# Patient Record
Sex: MALE | Race: WHITE | NOT HISPANIC OR LATINO | Employment: OTHER | URBAN - METROPOLITAN AREA
[De-identification: names, ages, dates, MRNs, and addresses within clinical notes are randomized per-mention and may not be internally consistent; named-entity substitution may affect disease eponyms.]

---

## 2018-05-27 NOTE — PROGRESS NOTES
Subjective:   Patient ID:  Gerardo Glez is a 68 y.o. male who presents for follow-up of fatigue    HPI:  The patient has no chest pain, TIA, palpitations, syncope or pre-syncope.Fatigue has been for quite a while but progressive. Used to walk 4 miles daily but now only a few per week and slower and some SOB/BERMAN. No chest pain. Most BPs high-and lows are in the mid to upper 130s.      Review of Systems   Constitution: Positive for malaise/fatigue. Negative for chills, decreased appetite, diaphoresis, fever, weakness, night sweats, weight gain and weight loss.   HENT: Negative for congestion, hoarse voice, nosebleeds, sore throat and tinnitus.    Eyes: Negative for blurred vision, double vision, vision loss in left eye, vision loss in right eye, visual disturbance and visual halos.   Cardiovascular: Positive for dyspnea on exertion. Negative for chest pain, claudication, cyanosis, irregular heartbeat, leg swelling, near-syncope, orthopnea, palpitations, paroxysmal nocturnal dyspnea and syncope.   Respiratory: Positive for shortness of breath. Negative for cough, hemoptysis, sleep disturbances due to breathing, snoring, sputum production and wheezing.    Endocrine: Negative for cold intolerance, heat intolerance, polydipsia, polyphagia and polyuria.   Hematologic/Lymphatic: Negative for adenopathy and bleeding problem. Does not bruise/bleed easily.   Skin: Negative for color change, dry skin, flushing, itching, nail changes, poor wound healing, rash, skin cancer, suspicious lesions and unusual hair distribution.   Musculoskeletal: Negative for arthritis, back pain, falls, gout, joint pain, joint swelling, muscle cramps, muscle weakness, myalgias and stiffness.   Gastrointestinal: Negative for abdominal pain, anorexia, change in bowel habit, constipation, diarrhea, dysphagia, heartburn, hematemesis, hematochezia, melena and vomiting.   Genitourinary: Negative for decreased libido, dysuria, hematuria, hesitancy and urgency.  "  Neurological: Negative for excessive daytime sleepiness, dizziness, focal weakness, headaches, light-headedness, loss of balance, numbness, paresthesias, seizures, sensory change, tremors and vertigo.   Psychiatric/Behavioral: Negative for altered mental status, depression, hallucinations, memory loss, substance abuse and suicidal ideas. The patient does not have insomnia and is not nervous/anxious.    Allergic/Immunologic: Negative for environmental allergies and hives.       Objective: BP (!) 162/88 (BP Location: Right arm, Patient Position: Sitting, BP Method: Large (Manual))   Pulse 82   Ht 5' 10" (1.778 m)   Wt 114 kg (251 lb 7 oz)   BMI 36.08 kg/m²      Physical Exam   Constitutional: He is oriented to person, place, and time. He appears well-developed and well-nourished. No distress.   HENT:   Head: Normocephalic.   Eyes: EOM are normal. Pupils are equal, round, and reactive to light.   Neck: Normal range of motion. No thyromegaly present.   Cardiovascular: Normal rate, regular rhythm, normal heart sounds and intact distal pulses.  Exam reveals no gallop and no friction rub.    No murmur heard.  Pulses:       Carotid pulses are 3+ on the right side, and 3+ on the left side.       Radial pulses are 3+ on the right side, and 3+ on the left side.        Femoral pulses are 3+ on the right side, and 3+ on the left side.       Popliteal pulses are 3+ on the right side, and 3+ on the left side.        Dorsalis pedis pulses are 3+ on the right side, and 3+ on the left side.        Posterior tibial pulses are 3+ on the right side, and 3+ on the left side.   Pulmonary/Chest: Effort normal and breath sounds normal. No respiratory distress. He has no wheezes. He has no rales. He exhibits no tenderness.   Abdominal: Soft. He exhibits no distension and no mass. There is no tenderness.   Musculoskeletal: Normal range of motion.   Lymphadenopathy:     He has no cervical adenopathy.   Neurological: He is alert and " oriented to person, place, and time.   Skin: Skin is warm. He is not diaphoretic. No cyanosis. Nails show no clubbing.   Psychiatric: He has a normal mood and affect. His speech is normal and behavior is normal. Judgment and thought content normal. Cognition and memory are normal.       Assessment:     1. Impaired fasting glucose    2. Chronic fatigue    3. HTN (hypertension), benign    4. Dyslipidemia    5. Abnormal ECG    6. Benign prostatic hyperplasia without lower urinary tract symptoms    7. Class 2 severe obesity due to excess calories with serious comorbidity and body mass index (BMI) of 36.0 to 36.9 in adult    8. At risk for coronary artery disease    9. HTN (hypertension)        Plan:   Discussed diet , achieving and maintaining ideal body weight, and exercise.   We reviewed meds in detail.  Take the HCTZ half daily  Add amlodipine 5 mg at nite  Wt loss and increased exercise  Baby ASA daily  Omega-3 > 800 per day of combined EPA/DHA    Gerardo was seen today for fatigue.    Diagnoses and all orders for this visit:    Impaired fasting glucose  -     Hemoglobin A1c; Future; Expected date: 05/29/2018  -     CT Cardiac Scoring; Future; Expected date: 05/29/2018    Chronic fatigue  -     TESTOSTERONE PANEL; Future; Expected date: 05/29/2018  -     Sedimentation rate, manual; Future; Expected date: 05/29/2018  -     TSH; Future; Expected date: 05/29/2018  -     Exercise stress echo with color flow; Future; Expected date: 05/29/2018    HTN (hypertension), benign  -     IN OFFICE EKG 12-LEAD (to Muse); Future; Expected date: 05/29/2018  -     Lipid panel; Future; Expected date: 05/29/2018  -     Comprehensive metabolic panel; Future; Expected date: 05/29/2018  -     TSH; Future; Expected date: 05/29/2018  -     Hemoglobin A1c; Future; Expected date: 05/29/2018  -     Exercise stress echo with color flow; Future; Expected date: 05/29/2018  -     CT Cardiac Scoring; Future; Expected date: 05/29/2018  -      amLODIPine (NORVASC) 5 MG tablet; Take 1 tablet (5 mg total) by mouth once daily. One-2 per day or as directed  -     aspirin (ECOTRIN) 81 MG EC tablet; Take 1 tablet (81 mg total) by mouth once daily.    Dyslipidemia  -     Lipid panel; Future; Expected date: 05/29/2018  -     Comprehensive metabolic panel; Future; Expected date: 05/29/2018  -     TSH; Future; Expected date: 05/29/2018  -     Hemoglobin A1c; Future; Expected date: 05/29/2018  -     Exercise stress echo with color flow; Future; Expected date: 05/29/2018  -     CT Cardiac Scoring; Future; Expected date: 05/29/2018  -     amLODIPine (NORVASC) 5 MG tablet; Take 1 tablet (5 mg total) by mouth once daily. One-2 per day or as directed  -     aspirin (ECOTRIN) 81 MG EC tablet; Take 1 tablet (81 mg total) by mouth once daily.    Abnormal ECG  -     IN OFFICE EKG 12-LEAD (to Muse); Future; Expected date: 05/29/2018  -     Lipid panel; Future; Expected date: 05/29/2018  -     Comprehensive metabolic panel; Future; Expected date: 05/29/2018  -     Exercise stress echo with color flow; Future; Expected date: 05/29/2018  -     CT Cardiac Scoring; Future; Expected date: 05/29/2018  -     amLODIPine (NORVASC) 5 MG tablet; Take 1 tablet (5 mg total) by mouth once daily. One-2 per day or as directed  -     aspirin (ECOTRIN) 81 MG EC tablet; Take 1 tablet (81 mg total) by mouth once daily.    Benign prostatic hyperplasia without lower urinary tract symptoms    Class 2 severe obesity due to excess calories with serious comorbidity and body mass index (BMI) of 36.0 to 36.9 in adult  -     Comprehensive metabolic panel; Future; Expected date: 05/29/2018  -     TSH; Future; Expected date: 05/29/2018  -     Hemoglobin A1c; Future; Expected date: 05/29/2018    At risk for coronary artery disease  -     CT Cardiac Scoring; Future; Expected date: 05/29/2018  -     amLODIPine (NORVASC) 5 MG tablet; Take 1 tablet (5 mg total) by mouth once daily. One-2 per day or as  directed  -     aspirin (ECOTRIN) 81 MG EC tablet; Take 1 tablet (81 mg total) by mouth once daily.    HTN (hypertension)  -     IN OFFICE EKG 12-LEAD (to Muse); Future; Expected date: 05/29/2018    Other orders  -     losartan (COZAAR) 100 MG tablet; Take 100 mg by mouth once daily.  -     tamsulosin HCl (TAMSULOSIN ORAL); Take 0.4 mg by mouth once daily.  -     rosuvastatin (CRESTOR) 10 MG tablet; Take 10 mg by mouth once daily.  -     hydroCHLOROthiazide (HYDRODIURIL) 25 MG tablet; Take 25 mg by mouth twice a week.  -     naproxen sodium (ANAPROX) 220 MG tablet; Take 220 mg by mouth once daily.  -     diphenhydrAMINE (BENADRYL) 25 mg capsule; Take 25 mg by mouth nightly as needed for Insomnia.  -     melatonin 5 mg Tab; Take 5 mg by mouth once daily.  -     sertraline (ZOLOFT) 100 MG tablet; Take 100 mg by mouth once daily.            Follow-up if symptoms worsen or fail to improve.

## 2018-05-28 ENCOUNTER — OFFICE VISIT (OUTPATIENT)
Dept: CARDIOLOGY | Facility: CLINIC | Age: 68
End: 2018-05-28
Payer: MEDICARE

## 2018-05-28 VITALS
WEIGHT: 251.44 LBS | SYSTOLIC BLOOD PRESSURE: 162 MMHG | HEIGHT: 70 IN | BODY MASS INDEX: 36 KG/M2 | HEART RATE: 82 BPM | DIASTOLIC BLOOD PRESSURE: 88 MMHG

## 2018-05-28 DIAGNOSIS — I10 HTN (HYPERTENSION), BENIGN: ICD-10-CM

## 2018-05-28 DIAGNOSIS — Z91.89 AT RISK FOR CORONARY ARTERY DISEASE: ICD-10-CM

## 2018-05-28 DIAGNOSIS — R94.31 ABNORMAL ECG: ICD-10-CM

## 2018-05-28 DIAGNOSIS — R73.01 IMPAIRED FASTING GLUCOSE: Primary | ICD-10-CM

## 2018-05-28 DIAGNOSIS — E66.01 CLASS 2 SEVERE OBESITY DUE TO EXCESS CALORIES WITH SERIOUS COMORBIDITY AND BODY MASS INDEX (BMI) OF 36.0 TO 36.9 IN ADULT: ICD-10-CM

## 2018-05-28 DIAGNOSIS — R53.82 CHRONIC FATIGUE: ICD-10-CM

## 2018-05-28 DIAGNOSIS — E78.5 DYSLIPIDEMIA: ICD-10-CM

## 2018-05-28 DIAGNOSIS — N40.0 BENIGN PROSTATIC HYPERPLASIA WITHOUT LOWER URINARY TRACT SYMPTOMS: ICD-10-CM

## 2018-05-28 DIAGNOSIS — I10 HTN (HYPERTENSION): ICD-10-CM

## 2018-05-28 PROBLEM — E66.812 CLASS 2 SEVERE OBESITY DUE TO EXCESS CALORIES WITH SERIOUS COMORBIDITY AND BODY MASS INDEX (BMI) OF 36.0 TO 36.9 IN ADULT: Status: ACTIVE | Noted: 2018-05-28

## 2018-05-28 PROCEDURE — 99204 OFFICE O/P NEW MOD 45 MIN: CPT | Mod: S$PBB,,, | Performed by: INTERNAL MEDICINE

## 2018-05-28 PROCEDURE — 93010 ELECTROCARDIOGRAM REPORT: CPT | Mod: ,,, | Performed by: INTERNAL MEDICINE

## 2018-05-28 PROCEDURE — 99999 PR PBB SHADOW E&M-NEW PATIENT-LVL III: CPT | Mod: PBBFAC,,, | Performed by: INTERNAL MEDICINE

## 2018-05-28 PROCEDURE — 99203 OFFICE O/P NEW LOW 30 MIN: CPT | Mod: PBBFAC,PO,25 | Performed by: INTERNAL MEDICINE

## 2018-05-28 PROCEDURE — 93005 ELECTROCARDIOGRAM TRACING: CPT | Mod: PBBFAC,PO | Performed by: INTERNAL MEDICINE

## 2018-05-28 RX ORDER — ASPIRIN 81 MG/1
81 TABLET ORAL DAILY
Qty: 30 TABLET | Refills: 0
Start: 2018-05-28 | End: 2020-11-09

## 2018-05-28 RX ORDER — AMLODIPINE BESYLATE 5 MG/1
5 TABLET ORAL DAILY
Qty: 90 TABLET | Refills: 3 | Status: SHIPPED | OUTPATIENT
Start: 2018-05-28 | End: 2018-05-29 | Stop reason: SDUPTHER

## 2018-05-28 RX ORDER — HYDROCHLOROTHIAZIDE 25 MG/1
25 TABLET ORAL
COMMUNITY
End: 2022-03-10 | Stop reason: SDUPTHER

## 2018-05-28 RX ORDER — DIPHENHYDRAMINE HCL 25 MG
25 CAPSULE ORAL NIGHTLY PRN
COMMUNITY

## 2018-05-28 RX ORDER — SERTRALINE HYDROCHLORIDE 100 MG/1
100 TABLET, FILM COATED ORAL DAILY
COMMUNITY

## 2018-05-28 RX ORDER — ROSUVASTATIN CALCIUM 10 MG/1
10 TABLET, COATED ORAL DAILY
COMMUNITY

## 2018-05-28 RX ORDER — ACETAMINOPHEN 500 MG
10 TABLET ORAL DAILY
COMMUNITY

## 2018-05-28 RX ORDER — NAPROXEN SODIUM 220 MG
220 TABLET ORAL DAILY
COMMUNITY
End: 2020-11-09

## 2018-05-28 RX ORDER — LOSARTAN POTASSIUM 100 MG/1
100 TABLET ORAL DAILY
COMMUNITY

## 2018-05-28 NOTE — PATIENT INSTRUCTIONS
Discussed diet , achieving and maintaining ideal body weight, and exercise.   We reviewed meds in detail.  Take the HCTZ half daily  Add amlodipine 5 mg at nite  Wt loss and increased exercise  Baby ASA daily  Omega-3 > 800 per day of combined EPA/DHA

## 2018-05-29 DIAGNOSIS — Z91.89 AT RISK FOR CORONARY ARTERY DISEASE: ICD-10-CM

## 2018-05-29 DIAGNOSIS — I10 HTN (HYPERTENSION), BENIGN: ICD-10-CM

## 2018-05-29 DIAGNOSIS — E78.5 DYSLIPIDEMIA: ICD-10-CM

## 2018-05-29 DIAGNOSIS — R94.31 ABNORMAL ECG: ICD-10-CM

## 2018-05-30 ENCOUNTER — TELEPHONE (OUTPATIENT)
Dept: CARDIOLOGY | Facility: CLINIC | Age: 68
End: 2018-05-30

## 2018-05-30 RX ORDER — AMLODIPINE BESYLATE 5 MG/1
TABLET ORAL
Qty: 90 TABLET | Refills: 3 | Status: SHIPPED | OUTPATIENT
Start: 2018-05-30 | End: 2019-11-15 | Stop reason: SDUPTHER

## 2018-05-30 NOTE — TELEPHONE ENCOUNTER
----- Message from Avery Ricks sent at 5/30/2018 11:06 AM CDT -----  Contact: patient  Please call pt at [phone] if any questions. Need clarification for Amlodipine 5 mg called into [facility] at [phone]    Last seen Dr Pepper 05/28/18    Thank you

## 2018-05-31 ENCOUNTER — LAB VISIT (OUTPATIENT)
Dept: LAB | Facility: HOSPITAL | Age: 68
End: 2018-05-31
Attending: INTERNAL MEDICINE
Payer: MEDICARE

## 2018-05-31 DIAGNOSIS — I10 HTN (HYPERTENSION), BENIGN: ICD-10-CM

## 2018-05-31 DIAGNOSIS — E78.5 DYSLIPIDEMIA: ICD-10-CM

## 2018-05-31 DIAGNOSIS — R53.82 CHRONIC FATIGUE: ICD-10-CM

## 2018-05-31 DIAGNOSIS — R73.01 IMPAIRED FASTING GLUCOSE: ICD-10-CM

## 2018-05-31 DIAGNOSIS — E66.01 CLASS 2 SEVERE OBESITY DUE TO EXCESS CALORIES WITH SERIOUS COMORBIDITY AND BODY MASS INDEX (BMI) OF 36.0 TO 36.9 IN ADULT: ICD-10-CM

## 2018-05-31 DIAGNOSIS — R94.31 ABNORMAL ECG: ICD-10-CM

## 2018-05-31 LAB
ALBUMIN SERPL BCP-MCNC: 3.9 G/DL
ALP SERPL-CCNC: 98 U/L
ALT SERPL W/O P-5'-P-CCNC: 25 U/L
ANION GAP SERPL CALC-SCNC: 7 MMOL/L
AST SERPL-CCNC: 21 U/L
BILIRUB SERPL-MCNC: 0.6 MG/DL
BUN SERPL-MCNC: 17 MG/DL
CALCIUM SERPL-MCNC: 9.1 MG/DL
CHLORIDE SERPL-SCNC: 107 MMOL/L
CHOLEST SERPL-MCNC: 123 MG/DL
CHOLEST/HDLC SERPL: 2.7 {RATIO}
CO2 SERPL-SCNC: 25 MMOL/L
CREAT SERPL-MCNC: 1 MG/DL
ERYTHROCYTE [SEDIMENTATION RATE] IN BLOOD BY WESTERGREN METHOD: 7 MM/HR
EST. GFR  (AFRICAN AMERICAN): >60 ML/MIN/1.73 M^2
EST. GFR  (NON AFRICAN AMERICAN): >60 ML/MIN/1.73 M^2
ESTIMATED AVG GLUCOSE: 108 MG/DL
GLUCOSE SERPL-MCNC: 111 MG/DL
HBA1C MFR BLD HPLC: 5.4 %
HDLC SERPL-MCNC: 46 MG/DL
HDLC SERPL: 37.4 %
LDLC SERPL CALC-MCNC: 66.4 MG/DL
NONHDLC SERPL-MCNC: 77 MG/DL
POTASSIUM SERPL-SCNC: 4 MMOL/L
PROT SERPL-MCNC: 7 G/DL
SODIUM SERPL-SCNC: 139 MMOL/L
TRIGL SERPL-MCNC: 53 MG/DL
TSH SERPL DL<=0.005 MIU/L-ACNC: 2.6 UIU/ML

## 2018-05-31 PROCEDURE — 84443 ASSAY THYROID STIM HORMONE: CPT

## 2018-05-31 PROCEDURE — 85651 RBC SED RATE NONAUTOMATED: CPT

## 2018-05-31 PROCEDURE — 80061 LIPID PANEL: CPT

## 2018-05-31 PROCEDURE — 36415 COLL VENOUS BLD VENIPUNCTURE: CPT | Mod: PO

## 2018-05-31 PROCEDURE — 83036 HEMOGLOBIN GLYCOSYLATED A1C: CPT

## 2018-05-31 PROCEDURE — 80053 COMPREHEN METABOLIC PANEL: CPT

## 2018-05-31 PROCEDURE — 82040 ASSAY OF SERUM ALBUMIN: CPT

## 2018-06-05 ENCOUNTER — HOSPITAL ENCOUNTER (OUTPATIENT)
Dept: CARDIOLOGY | Facility: CLINIC | Age: 68
Discharge: HOME | End: 2018-06-05
Attending: INTERNAL MEDICINE
Payer: MEDICARE

## 2018-06-05 ENCOUNTER — DOCUMENTATION ONLY (OUTPATIENT)
Dept: CARDIOLOGY | Facility: CLINIC | Age: 68
End: 2018-06-05

## 2018-06-05 ENCOUNTER — TELEPHONE (OUTPATIENT)
Dept: CARDIOLOGY | Facility: CLINIC | Age: 68
End: 2018-06-05

## 2018-06-05 ENCOUNTER — HOSPITAL ENCOUNTER (OUTPATIENT)
Dept: RADIOLOGY | Facility: HOSPITAL | Age: 68
Discharge: HOME | End: 2018-06-05
Attending: INTERNAL MEDICINE
Payer: MEDICARE

## 2018-06-05 DIAGNOSIS — E78.5 DYSLIPIDEMIA: ICD-10-CM

## 2018-06-05 DIAGNOSIS — Z91.89 AT RISK FOR CORONARY ARTERY DISEASE: ICD-10-CM

## 2018-06-05 DIAGNOSIS — R94.31 ABNORMAL ECG: ICD-10-CM

## 2018-06-05 DIAGNOSIS — I10 HTN (HYPERTENSION), BENIGN: ICD-10-CM

## 2018-06-05 DIAGNOSIS — R73.01 IMPAIRED FASTING GLUCOSE: ICD-10-CM

## 2018-06-05 DIAGNOSIS — R53.82 CHRONIC FATIGUE: ICD-10-CM

## 2018-06-05 LAB
DIASTOLIC DYSFUNCTION: NO
ESTIMATED PA SYSTOLIC PRESSURE: 21.32
RETIRED EF AND QEF - SEE NOTES: 60 (ref 55–65)
TRICUSPID VALVE REGURGITATION: NORMAL

## 2018-06-05 PROCEDURE — 93325 DOPPLER ECHO COLOR FLOW MAPG: CPT | Mod: PBBFAC | Performed by: INTERNAL MEDICINE

## 2018-06-05 PROCEDURE — 93320 DOPPLER ECHO COMPLETE: CPT | Mod: 26,S$PBB,, | Performed by: INTERNAL MEDICINE

## 2018-06-05 PROCEDURE — 75571 CT HRT W/O DYE W/CA TEST: CPT | Mod: TC

## 2018-06-05 PROCEDURE — 93351 STRESS TTE COMPLETE: CPT | Mod: 26,S$PBB,, | Performed by: INTERNAL MEDICINE

## 2018-06-05 PROCEDURE — 75571 CT HRT W/O DYE W/CA TEST: CPT | Mod: 26,,, | Performed by: RADIOLOGY

## 2018-06-06 ENCOUNTER — TELEPHONE (OUTPATIENT)
Dept: CARDIOLOGY | Facility: CLINIC | Age: 68
End: 2018-06-06

## 2018-06-06 LAB
ALBUMIN SERPL-MCNC: 4.4 G/DL (ref 3.6–5.1)
SHBG SERPL-SCNC: 50 NMOL/L (ref 22–77)
TESTOST FREE SERPL-MCNC: 53.3 PG/ML (ref 46–224)
TESTOST SERPL-MCNC: 558 NG/DL (ref 250–1100)
TESTOSTERONE.FREE+WB SERPL-MCNC: 107.4 NG/DL (ref 110–575)

## 2018-06-06 NOTE — TELEPHONE ENCOUNTER
Pt instructed as ordered by  that his stress test came out good and his b/p was high with exercise but not bad at rest. If his systolic b/p is >130 consistently then he will have to adjust his b/p medications.The pt said he had not been checking it regularly and was not sure, he will take his b/p daily for 1 week and he will call back in one week with his b/p readings.Pt reports understanding of these instructions.

## 2018-06-06 NOTE — TELEPHONE ENCOUNTER
----- Message from Bryan Pepper MD sent at 6/6/2018  1:02 PM CDT -----  Testosterone level seems OK

## 2018-10-09 ENCOUNTER — TELEPHONE (OUTPATIENT)
Dept: CARDIOLOGY | Facility: CLINIC | Age: 68
End: 2018-10-09

## 2018-10-09 NOTE — TELEPHONE ENCOUNTER
----- Message from Snow Desir sent at 10/9/2018  9:42 AM CDT -----  Contact: Pt called   Pt would like to know what Pulmonologist does Dr. Pepper work with. Please call pt @ [phone]. Thank you.

## 2019-11-15 DIAGNOSIS — I10 HTN (HYPERTENSION), BENIGN: ICD-10-CM

## 2019-11-15 DIAGNOSIS — E78.5 DYSLIPIDEMIA: ICD-10-CM

## 2019-11-15 DIAGNOSIS — R94.31 ABNORMAL ECG: ICD-10-CM

## 2019-11-15 DIAGNOSIS — Z91.89 AT RISK FOR CORONARY ARTERY DISEASE: ICD-10-CM

## 2019-11-15 RX ORDER — AMLODIPINE BESYLATE 5 MG/1
TABLET ORAL
Qty: 90 TABLET | Refills: 3 | Status: SHIPPED | OUTPATIENT
Start: 2019-11-15 | End: 2020-11-09 | Stop reason: SDUPTHER

## 2019-11-15 NOTE — TELEPHONE ENCOUNTER
----- Message from Ashley Álvarez sent at 11/15/2019  2:19 PM CST -----  Contact: pt  Patient is calling for an RX refill or new RX.  Is this a refill or new RX:  refill  RX name and strength: amLODIPine (NORVASC) 5 MG tablet  Directions Take One-2 tablets by mouth per day or as directed  Is this a 30 day or 90 day RX: 90   Local pharmacy or mail order pharmacy:  local  Pharm[facility] name and phone #    [facility] [address] [facility] [phone] (Phone)  [phone] (Fax)    Dr. Shantelle STREETER 5/28/18    Thank you

## 2020-11-09 ENCOUNTER — OFFICE VISIT (OUTPATIENT)
Dept: CARDIOLOGY | Facility: CLINIC | Age: 70
End: 2020-11-09
Payer: MEDICARE

## 2020-11-09 ENCOUNTER — HOSPITAL ENCOUNTER (OUTPATIENT)
Dept: CARDIOLOGY | Facility: CLINIC | Age: 70
Discharge: HOME | End: 2020-11-09
Payer: MEDICARE

## 2020-11-09 VITALS
SYSTOLIC BLOOD PRESSURE: 137 MMHG | BODY MASS INDEX: 39.29 KG/M2 | DIASTOLIC BLOOD PRESSURE: 82 MMHG | WEIGHT: 280.63 LBS | OXYGEN SATURATION: 96 % | HEIGHT: 71 IN | HEART RATE: 84 BPM

## 2020-11-09 DIAGNOSIS — R93.1 AGATSTON CORONARY ARTERY CALCIUM SCORE BETWEEN 100 AND 199: ICD-10-CM

## 2020-11-09 DIAGNOSIS — I25.10 CORONARY ARTERY CALCIFICATION SEEN ON CAT SCAN: ICD-10-CM

## 2020-11-09 DIAGNOSIS — R94.31 ABNORMAL ECG: ICD-10-CM

## 2020-11-09 DIAGNOSIS — E66.01 CLASS 2 SEVERE OBESITY DUE TO EXCESS CALORIES WITH SERIOUS COMORBIDITY AND BODY MASS INDEX (BMI) OF 39.0 TO 39.9 IN ADULT: ICD-10-CM

## 2020-11-09 DIAGNOSIS — I10 HTN (HYPERTENSION), BENIGN: ICD-10-CM

## 2020-11-09 DIAGNOSIS — E78.5 DYSLIPIDEMIA: ICD-10-CM

## 2020-11-09 DIAGNOSIS — Z91.89 AT RISK FOR CORONARY ARTERY DISEASE: ICD-10-CM

## 2020-11-09 DIAGNOSIS — I25.10 CORONARY ARTERY CALCIFICATION SEEN ON CAT SCAN: Primary | ICD-10-CM

## 2020-11-09 PROCEDURE — 99214 OFFICE O/P EST MOD 30 MIN: CPT | Mod: S$PBB,,, | Performed by: NURSE PRACTITIONER

## 2020-11-09 PROCEDURE — 99214 PR OFFICE/OUTPT VISIT, EST, LEVL IV, 30-39 MIN: ICD-10-PCS | Mod: S$PBB,,, | Performed by: NURSE PRACTITIONER

## 2020-11-09 PROCEDURE — 99214 OFFICE O/P EST MOD 30 MIN: CPT | Mod: PBBFAC,25 | Performed by: NURSE PRACTITIONER

## 2020-11-09 PROCEDURE — 93005 ELECTROCARDIOGRAM TRACING: CPT | Mod: PBBFAC | Performed by: INTERNAL MEDICINE

## 2020-11-09 PROCEDURE — 99999 PR PBB SHADOW E&M-EST. PATIENT-LVL IV: ICD-10-PCS | Mod: PBBFAC,,, | Performed by: NURSE PRACTITIONER

## 2020-11-09 PROCEDURE — 99999 PR PBB SHADOW E&M-EST. PATIENT-LVL IV: CPT | Mod: PBBFAC,,, | Performed by: NURSE PRACTITIONER

## 2020-11-09 PROCEDURE — 93010 ELECTROCARDIOGRAM REPORT: CPT | Mod: S$PBB,,, | Performed by: INTERNAL MEDICINE

## 2020-11-09 PROCEDURE — 93010 EKG 12-LEAD: ICD-10-PCS | Mod: S$PBB,,, | Performed by: INTERNAL MEDICINE

## 2020-11-09 RX ORDER — AMLODIPINE BESYLATE 10 MG/1
TABLET ORAL
Qty: 90 TABLET | Refills: 3 | Status: SHIPPED | OUTPATIENT
Start: 2020-11-09 | End: 2022-02-09

## 2020-11-09 RX ORDER — AMLODIPINE BESYLATE 5 MG/1
TABLET ORAL
Qty: 90 TABLET | Refills: 3 | Status: SHIPPED | OUTPATIENT
Start: 2020-11-09 | End: 2020-11-09

## 2020-11-09 NOTE — PROGRESS NOTES
Mr. Glez is a patient of Dr. Pepper and was last seen in [facility] Visit 5/28/18.      Subjective:   Patient ID:  Gerardo Glez Jr. is a 70 y.o. male who presents for follow-up of Hypertension and Dyslipidemia    Problems:  CACS 143 in 2018  HLD  HTN  HAILE, using cpap    HPI  Mr. Glez is in clinic today for routine follow up.  Patient denies chest pain with exertion or at rest, palpitations, SOB, BERMAN, dizziness, syncope, edema, orthopnea, PND, or claudication.  Reports walking 2 miles in the park about once a week.  Patient is taking rosuvastatin 10 mg and LDL is 66.  His home BP runs in the 130s-150s/70-80s.      Review of Systems   Constitution: Negative for decreased appetite, diaphoresis, malaise/fatigue, weight gain and weight loss.   Eyes: Negative for visual disturbance.   Cardiovascular: Negative for chest pain, claudication, dyspnea on exertion, irregular heartbeat, leg swelling, near-syncope, orthopnea, palpitations, paroxysmal nocturnal dyspnea and syncope.        Denies chest pressure   Respiratory: Negative for cough, hemoptysis, shortness of breath, sleep disturbances due to breathing and snoring.    Endocrine: Negative for cold intolerance and heat intolerance.   Hematologic/Lymphatic: Negative for bleeding problem. Does not bruise/bleed easily.   Musculoskeletal: Negative for myalgias.   Gastrointestinal: Negative for bloating, abdominal pain, anorexia, change in bowel habit, constipation, diarrhea, nausea and vomiting.   Neurological: Negative for difficulty with concentration, disturbances in coordination, excessive daytime sleepiness, dizziness, headaches, light-headedness, loss of balance, numbness and weakness.   Psychiatric/Behavioral: The patient does not have insomnia.        Allergies and current medications updated and reviewed:  Review of patient's allergies indicates:  No Known Allergies  Current Outpatient Medications   Medication Sig    amLODIPine (NORVASC) 5 MG tablet Take One by  "mouth per day or as directed.    diphenhydrAMINE (BENADRYL) 25 mg capsule Take 25 mg by mouth nightly as needed for Insomnia.    hydroCHLOROthiazide (HYDRODIURIL) 25 MG tablet Take 25 mg by mouth twice a week.    losartan (COZAAR) 100 MG tablet Take 100 mg by mouth once daily.    melatonin 5 mg Tab Take 10 mg by mouth once daily.     rosuvastatin (CRESTOR) 10 MG tablet Take 10 mg by mouth once daily.    sertraline (ZOLOFT) 100 MG tablet Take 100 mg by mouth once daily.    tamsulosin HCl (TAMSULOSIN ORAL) Take 0.4 mg by mouth once daily.     No current facility-administered medications for this visit.        Objective:     Right Arm BP - Sittin/83 (20 1503)  Left Arm BP - Sittin/82 (20 1503)    /82 (BP Location: Left arm, Patient Position: Sitting, BP Method: Large (Automatic))   Pulse 84   Ht 5' 11" (1.803 m)   Wt 127.3 kg (280 lb 10.3 oz)   SpO2 96%   BMI 39.14 kg/m²       Physical Exam   Constitutional: He is oriented to person, place, and time. Vital signs are normal. He appears well-developed and well-nourished. He is active. No distress.   HENT:   Head: Normocephalic and atraumatic.   Eyes: Conjunctivae and lids are normal. No scleral icterus.   Neck: Neck supple. Normal carotid pulses, no hepatojugular reflux and no JVD present. Carotid bruit is not present.   Cardiovascular: Normal rate, regular rhythm, S1 normal, S2 normal and intact distal pulses. PMI is not displaced. Exam reveals no gallop and no friction rub.   No murmur heard.  Pulses:       Carotid pulses are 2+ on the right side and 2+ on the left side.       Radial pulses are 2+ on the right side and 2+ on the left side.        Dorsalis pedis pulses are 2+ on the right side and 2+ on the left side.        Posterior tibial pulses are 1+ on the right side and 1+ on the left side.   Pulmonary/Chest: Effort normal and breath sounds normal. No respiratory distress. He has no decreased breath sounds. He has no " wheezes. He has no rhonchi. He has no rales. He exhibits no tenderness.   Abdominal: Soft. Normal appearance and bowel sounds are normal. He exhibits no distension, no fluid wave, no abdominal bruit, no ascites and no pulsatile midline mass. There is no hepatosplenomegaly. There is no abdominal tenderness.   Musculoskeletal:         General: No edema.   Neurological: He is alert and oriented to person, place, and time. Gait normal.   Skin: Skin is warm, dry and intact. No rash noted. He is not diaphoretic. Nails show no clubbing.   Psychiatric: He has a normal mood and affect. His speech is normal and behavior is normal. Judgment and thought content normal. Cognition and memory are normal.   Nursing note and vitals reviewed.      Chemistry        Component Value Date/Time     05/31/2018 0904    K 4.0 05/31/2018 0904     05/31/2018 0904    CO2 25 05/31/2018 0904    BUN 17 05/31/2018 0904    CREATININE 1.0 05/31/2018 0904     (H) 05/31/2018 0904        Component Value Date/Time    CALCIUM 9.1 05/31/2018 0904    ALKPHOS 98 05/31/2018 0904    AST 21 05/31/2018 0904    ALT 25 05/31/2018 0904    BILITOT 0.6 05/31/2018 0904    ESTGFRAFRICA >60.0 05/31/2018 0904    EGFRNONAA >60.0 05/31/2018 0904        Lab Results   Component Value Date    HGBA1C 5.4 05/31/2018     Recent Labs   Lab 05/31/18  0904   TSH 2.597   Cholesterol 123   HDL 46   LDL Cholesterol 66.4   Triglycerides 53   HDL/Cholesterol Ratio 37.4              Test(s) Reviewed  I have reviewed the following in detail:  [x] Stress test   [] Angiography   [x] Echocardiogram   [x] Labs   [] Other:         Assessment/Plan:   1. Coronary artery calcification seen on CAT scan  Asymptomatic. Taking moderate intensity statin and ASA. ECG today shows NSR without significant changes.  Reports recent stress test and carotid ultrasound.  Requested records from PCP.     - SCHEDULED EKG 12-LEAD (to Muse); Future    2. Agatston coronary artery calcium score  between 100 and 199    - SCHEDULED EKG 12-LEAD (to Muse); Future    3. Dyslipidemia  LDL at goal <70 in 2018.  Requested copy of recent labs from PCP.     - Comprehensive Metabolic Panel; Future  - Lipid Panel; Future    4. HTN (hypertension), benign  BP not at goal <130/80. Increase amlodipine to 10 mg daily.  Requested 2 week bp log. Sees PCP q3 months so will defer to PCP for further adjustments.  Discussed possibility of LE edema with increased dose and requested he call if he experiences significant LE edema.     - Comprehensive Metabolic Panel; Future  - amLODIPine (NORVASC) 10 MG tablet; Take One by mouth per day or as directed.  Dispense: 90 tablet; Refill: 3    5. Class 2 severe obesity due to excess calories with serious comorbidity and body mass index (BMI) of 39.0 to 39.9 in adult  BMI 39.1 Encouraged increased CV exercise to 30 minutes a day for 5 days a week.     - Magnesium; Future  - CBC Without Differential; Future    A copy of this note will be sent to Dr. Strickland and Dr. Hoffman.     Follow up in about 1 year (around 11/9/2021).

## 2020-11-09 NOTE — PATIENT INSTRUCTIONS
Increase the dose of the amlodipine to 10 mg.  You can take 2 of the 5 mg tablets a day until you run out.  Then start the new prescription.     Check your blood pressure daily and email or call me in 2 weeks with your readings.  [phone]    Increase cardiovascular exercise to 30 minutes of brisk walking a day for 4-5 days a week.  You can use a stationary bike or swim for 30 minutes a day instead of walking.  Whatever exercise you choose, make sure you are working hard enough to increase your heart rate.     Eating Heart-Healthy Food: Using the DASH Plan    Eating for your heart doesnt have to be hard or boring. You just need to know how to make healthier choices. The DASH eating plan has been developed to help you do just that. DASH stands for Dietary Approaches to Stop Hypertension. It is a plan that has been proven to be healthier for your heart and to lower your risk for high blood pressure. It can also help lower your risk for cancer, heart disease, osteoporosis, and diabetes.  Choosing from each food group  Choose foods from each of the food groups below each day. Try to get the recommended number of servings for each food group. The serving numbers are based on a diet of 2,000 calories a day. Talk to your doctor if youre unsure about your calorie needs. Along with getting the correct servings, the DASH plan also recommends a sodium intake less than 2,300 mg per day.        Grains  Servings: 6 to 8 a day  A serving is:  · 1 slice bread  · 1 ounce dry cereal  · Half a cup cooked rice, pasta or cereal  Best choices: Whole grains and any grains high in fiber. Vegetables  Servings: 4 to 5 a day  A serving is:  · 1 cup raw leafy vegetable  · Half a cup cut-up raw or cooked vegetable  · Half a cup vegetable juice  Best choices: Fresh or frozen vegetables prepared without added salt or fat.   Fruits  Servings: 4 to 5 a day  A serving is:  · 1 medium fruit  · One-quarter cup dried fruit  · Half a cup fresh,  frozen, or canned fruit  · Half a cup of 100% fruit juices  Best choices: A variety of fresh fruits of different colors. Whole fruits are a better choice than fruit juices. Low-fat or fat-free dairy  Servings: 2 to 3 a day  A serving is:  · 1 cup milk  · 1 cup yogurt  · One and a half ounces cheese  Best choices: Skim or 1% milk, low-fat or fat-free yogurt or buttermilk, and low-fat cheeses.         Lean meats, poultry, fish  Servings: 6 or fewer a day  A serving is:  · 1 ounce cooked meats, poultry, or fish  · 1 egg  Best choices: Lean poultry and fish. Trim away visible fat. Broil, grill, roast, or boil instead of frying. Remove skin from poultry before eating. Limit how much red meat you eat.  Nuts, seeds, beans  Servings: 4 to 5 a week  A serving is:  · One-third cup nuts (one and a half ounces)  · 2 tablespoons nut butter or seeds  · Half a cup cooked dry beans or legumes  Best choices: Dry roasted nuts with no salt added, lentils, kidney beans, garbanzo beans, and whole dixon beans.   Fats and oils  Servings: 2 to 3 a day  A serving is:  · 1 teaspoon vegetable oil  · 1 teaspoon soft margarine  · 1 tablespoon mayonnaise  · 2 tablespoons salad dressing  Best choices: Nut and vegetable oils (nontropical vegetable oils), such as olive and canola oil. Sweets  Servings: 5 a week or fewer  A serving is:  · 1 tablespoon sugar, maple syrup, or honey  · 1 tablespoon jam or jelly  · 1 half-ounce jelly beans (about 15)  · 1 cup lemonade  Best choices: Dried fruit can be a satisfying sweet. Choose low-fat sweets. And watch your serving sizes!      For more on the DASH eating plan, visit:  www.nhlbi.nih.gov/health/health-topics/topics/dash   Date Last Reviewed: 6/1/2016  © 2416-5262 [employer]. [address] [place] [address], [place] [address]. All rights reserved. This information is not intended as a substitute for professional medical care. Always follow your healthcare professional's instructions.

## 2022-03-09 NOTE — PROGRESS NOTES
Subjective:   Patient ID:  Gerardo Glez Jr. is a 71 y.o. male who presents for follow-up of CAD risk    HPI: The patient is here for CAD risk factors  in 2018 better than average.   The patient has no chest pain, SOB, TIA, palpitations, syncope or pre-syncope.Patient does not exercise a lot.BP 130s        Review of Systems   Constitutional: Negative for chills, decreased appetite, diaphoresis, fever, malaise/fatigue, night sweats, weight gain and weight loss.   HENT: Negative for congestion, hoarse voice, nosebleeds, sore throat and tinnitus.    Eyes: Negative for blurred vision, double vision, vision loss in left eye, vision loss in right eye, visual disturbance and visual halos.   Cardiovascular: Negative for chest pain, claudication, cyanosis, dyspnea on exertion, irregular heartbeat, leg swelling, near-syncope, orthopnea, palpitations, paroxysmal nocturnal dyspnea and syncope.   Respiratory: Negative for cough, hemoptysis, shortness of breath, sleep disturbances due to breathing, snoring, sputum production and wheezing.    Endocrine: Negative for cold intolerance, heat intolerance, polydipsia, polyphagia and polyuria.   Hematologic/Lymphatic: Negative for adenopathy and bleeding problem. Does not bruise/bleed easily.   Skin: Negative for color change, dry skin, flushing, itching, nail changes, poor wound healing, rash, skin cancer, suspicious lesions and unusual hair distribution.   Musculoskeletal: Negative for arthritis, back pain, falls, gout, joint pain, joint swelling, muscle cramps, muscle weakness, myalgias and stiffness.   Gastrointestinal: Negative for abdominal pain, anorexia, change in bowel habit, constipation, diarrhea, dysphagia, heartburn, hematemesis, hematochezia, melena and vomiting.   Genitourinary: Negative for decreased libido, dysuria, hematuria, hesitancy and urgency.   Neurological: Negative for excessive daytime sleepiness, dizziness, focal weakness, headaches,  "light-headedness, loss of balance, numbness, paresthesias, seizures, sensory change, tremors, vertigo and weakness.   Psychiatric/Behavioral: Negative for altered mental status, depression, hallucinations, memory loss, substance abuse and suicidal ideas. The patient does not have insomnia and is not nervous/anxious.    Allergic/Immunologic: Negative for environmental allergies and hives.       Objective: /67 (BP Location: Left arm, Patient Position: Sitting, BP Method: Large (Automatic))   Pulse 97   Ht 5' 11" (1.803 m)   Wt 130.4 kg (287 lb 7.7 oz)   BMI 40.10 kg/m²      Physical Exam  Constitutional:       General: He is not in acute distress.     Appearance: He is well-developed. He is not diaphoretic.   HENT:      Head: Normocephalic.   Eyes:      Pupils: Pupils are equal, round, and reactive to light.   Neck:      Thyroid: No thyromegaly.   Cardiovascular:      Rate and Rhythm: Normal rate and regular rhythm.      Pulses: Intact distal pulses.           Carotid pulses are 3+ on the right side and 3+ on the left side.       Radial pulses are 3+ on the right side and 3+ on the left side.        Femoral pulses are 3+ on the right side and 3+ on the left side.       Popliteal pulses are 3+ on the right side and 3+ on the left side.        Dorsalis pedis pulses are 3+ on the right side and 3+ on the left side.        Posterior tibial pulses are 3+ on the right side and 3+ on the left side.      Heart sounds: Normal heart sounds. No murmur heard.    No friction rub. No gallop.   Pulmonary:      Effort: Pulmonary effort is normal. No respiratory distress.      Breath sounds: Normal breath sounds. No wheezing or rales.   Chest:      Chest wall: No tenderness.   Abdominal:      General: There is no distension.      Palpations: Abdomen is soft. There is no mass.      Tenderness: There is no abdominal tenderness.   Musculoskeletal:         General: Normal range of motion.      Cervical back: Normal range of " motion.   Lymphadenopathy:      Cervical: No cervical adenopathy.   Skin:     General: Skin is warm.      Nails: There is no clubbing.   Neurological:      Mental Status: He is alert and oriented to person, place, and time.   Psychiatric:         Speech: Speech normal.         Behavior: Behavior normal.         Thought Content: Thought content normal.         Judgment: Judgment normal.         Assessment:     1. Agatston coronary artery calcium score between 100 and 199    2. HTN (hypertension), benign    3. Dyslipidemia    4. Abnormal ECG    5. Chronic fatigue    6. Class 2 severe obesity due to excess calories with serious comorbidity and body mass index (BMI) of 36.0 to 36.9 in adult        Plan:   Discussed diet , achieving and maintaining ideal body weight, and exercise.   We reviewed meds in detail.  Reassured-Discussed goals, options, plan.  Omega-3 > 800 mg/d combined EPA/DHA.  Goal BP< 130/80.  Goal LDL < 70 or at least close since CAC not so high.  Increase HCTZ to half daily instead of 2 /week    Gerardo was seen today for follow-up and annual exam.    Diagnoses and all orders for this visit:    Agatston coronary artery calcium score between 100 and 199    HTN (hypertension), benign  -     amLODIPine (NORVASC) 10 MG tablet; TAKE 1 TABLET BY MOUTH ONCE DAILY AS DIRECTED  -     hydroCHLOROthiazide (HYDRODIURIL) 25 MG tablet; Take 1 tablet (25 mg total) by mouth once daily.    Dyslipidemia    Abnormal ECG    Chronic fatigue    Class 2 severe obesity due to excess calories with serious comorbidity and body mass index (BMI) of 36.0 to 36.9 in adult            Follow up for As needed by patient or Dr Galicia.

## 2022-03-10 ENCOUNTER — OFFICE VISIT (OUTPATIENT)
Dept: CARDIOLOGY | Facility: CLINIC | Age: 72
End: 2022-03-10
Payer: MEDICARE

## 2022-03-10 VITALS
HEIGHT: 71 IN | HEART RATE: 97 BPM | SYSTOLIC BLOOD PRESSURE: 134 MMHG | BODY MASS INDEX: 40.25 KG/M2 | DIASTOLIC BLOOD PRESSURE: 67 MMHG | WEIGHT: 287.5 LBS

## 2022-03-10 DIAGNOSIS — E78.5 DYSLIPIDEMIA: ICD-10-CM

## 2022-03-10 DIAGNOSIS — E66.01 CLASS 2 SEVERE OBESITY DUE TO EXCESS CALORIES WITH SERIOUS COMORBIDITY AND BODY MASS INDEX (BMI) OF 36.0 TO 36.9 IN ADULT: ICD-10-CM

## 2022-03-10 DIAGNOSIS — R93.1 AGATSTON CORONARY ARTERY CALCIUM SCORE BETWEEN 100 AND 199: Primary | ICD-10-CM

## 2022-03-10 DIAGNOSIS — I10 HTN (HYPERTENSION), BENIGN: ICD-10-CM

## 2022-03-10 DIAGNOSIS — R53.82 CHRONIC FATIGUE: ICD-10-CM

## 2022-03-10 DIAGNOSIS — R94.31 ABNORMAL ECG: ICD-10-CM

## 2022-03-10 PROCEDURE — 99215 OFFICE O/P EST HI 40 MIN: CPT | Mod: S$PBB,,, | Performed by: INTERNAL MEDICINE

## 2022-03-10 PROCEDURE — 99999 PR PBB SHADOW E&M-EST. PATIENT-LVL IV: ICD-10-PCS | Mod: PBBFAC,,, | Performed by: INTERNAL MEDICINE

## 2022-03-10 PROCEDURE — 99215 PR OFFICE/OUTPT VISIT, EST, LEVL V, 40-54 MIN: ICD-10-PCS | Mod: S$PBB,,, | Performed by: INTERNAL MEDICINE

## 2022-03-10 PROCEDURE — 99214 OFFICE O/P EST MOD 30 MIN: CPT | Mod: PBBFAC | Performed by: INTERNAL MEDICINE

## 2022-03-10 PROCEDURE — 99999 PR PBB SHADOW E&M-EST. PATIENT-LVL IV: CPT | Mod: PBBFAC,,, | Performed by: INTERNAL MEDICINE

## 2022-03-10 RX ORDER — HYDROCHLOROTHIAZIDE 25 MG/1
25 TABLET ORAL DAILY
Qty: 90 TABLET | Refills: 3 | Status: SHIPPED | OUTPATIENT
Start: 2022-03-10 | End: 2023-04-11 | Stop reason: SDUPTHER

## 2022-03-10 RX ORDER — AMLODIPINE BESYLATE 10 MG/1
TABLET ORAL
Qty: 90 TABLET | Refills: 3 | Status: SHIPPED | OUTPATIENT
Start: 2022-03-10 | End: 2023-03-24 | Stop reason: SDUPTHER

## 2022-03-10 NOTE — PATIENT INSTRUCTIONS
Discussed diet , achieving and maintaining ideal body weight, and exercise.   We reviewed meds in detail.  Reassured-Discussed goals, options, plan.  Omega-3 > 800 mg/d combined EPA/DHA.  Goal BP< 130/80.  Goal LDL < 70 or at least close since CAC not so high.  Increase HCTZ to half daily instead of 2 /week

## 2023-03-24 DIAGNOSIS — I10 HTN (HYPERTENSION), BENIGN: ICD-10-CM

## 2023-03-24 RX ORDER — AMLODIPINE BESYLATE 10 MG/1
TABLET ORAL
Qty: 90 TABLET | Refills: 3 | Status: SHIPPED | OUTPATIENT
Start: 2023-03-24 | End: 2024-03-21 | Stop reason: SDUPTHER

## 2023-03-24 NOTE — TELEPHONE ENCOUNTER
----- Message from Apolonia Chávez sent at 3/24/2023  1:23 PM CDT -----  Regarding: Refill  Pt [phone] says he and [facility] have been trying to get a refill on his Norvasc 10 mg since last Friday. He would like a call once this refill has been sent to the pharmacy.    LOV 3/10/22 Dr. Shantelle Britton's [facility] [address] [place] [address] Street   Phone:  [phone]  Fax:  [phone]      Thanks

## 2023-04-11 NOTE — PROGRESS NOTES
Patient identified by 2 identifiers. Denies previous reactions to blood transfusions and allergies reviewed.  Procedure explained & consent obtained.  22 g IV placed to Lt AC, flushed w/ 10cc NS pre & post contrast administration.  3cc Optison administered, echo images obtained.  Pt tolerated procedure well.  IV D/C'ed, preasure dsg applied.  Pt D/C'ed to home.  
Vaccine status unknown

## 2024-03-21 DIAGNOSIS — I10 HTN (HYPERTENSION), BENIGN: ICD-10-CM

## 2024-03-21 RX ORDER — AMLODIPINE BESYLATE 10 MG/1
TABLET ORAL
Qty: 90 TABLET | Refills: 3 | Status: SHIPPED | OUTPATIENT
Start: 2024-03-21

## 2024-04-05 DIAGNOSIS — I10 HTN (HYPERTENSION), BENIGN: ICD-10-CM

## 2024-04-05 RX ORDER — HYDROCHLOROTHIAZIDE 25 MG/1
25 TABLET ORAL DAILY
Qty: 90 TABLET | Refills: 3 | Status: SHIPPED | OUTPATIENT
Start: 2024-04-05

## 2025-04-20 DIAGNOSIS — I10 HTN (HYPERTENSION), BENIGN: ICD-10-CM

## 2025-04-21 NOTE — TELEPHONE ENCOUNTER
Refill Routing Note   Medication(s) are not appropriate for processing by [facility] for the following reason(s):        Patient not seen by provider within 15 months  Required vitals outdated  Required labs outdated    [facility] action(s):  Defer             Appointments  past 12m or future 3m with PCP    Date Provider   Last Visit   Visit date not found Eric Ross MD   Next Visit   Visit date not found Eric Ross MD   ED visits in past 90 days: 0        Note composed:11:17 AM 04/21/2025

## 2025-04-22 RX ORDER — HYDROCHLOROTHIAZIDE 25 MG/1
25 TABLET ORAL
Qty: 90 TABLET | Refills: 0 | Status: SHIPPED | OUTPATIENT
Start: 2025-04-22